# Patient Record
Sex: MALE | Race: WHITE
[De-identification: names, ages, dates, MRNs, and addresses within clinical notes are randomized per-mention and may not be internally consistent; named-entity substitution may affect disease eponyms.]

---

## 2019-02-22 ENCOUNTER — HOSPITAL ENCOUNTER (OUTPATIENT)
Dept: HOSPITAL 89 - LAB | Age: 64
End: 2019-02-22
Attending: OTOLARYNGOLOGY
Payer: COMMERCIAL

## 2019-02-22 DIAGNOSIS — H93.19: Primary | ICD-10-CM

## 2019-02-22 PROCEDURE — 82565 ASSAY OF CREATININE: CPT

## 2019-02-22 PROCEDURE — 36415 COLL VENOUS BLD VENIPUNCTURE: CPT

## 2019-02-28 ENCOUNTER — HOSPITAL ENCOUNTER (OUTPATIENT)
Dept: HOSPITAL 89 - AUD | Age: 64
End: 2019-02-28
Attending: OTOLARYNGOLOGY
Payer: COMMERCIAL

## 2019-02-28 DIAGNOSIS — H93.19: Primary | ICD-10-CM

## 2019-02-28 PROCEDURE — 92570 ACOUSTIC IMMITANCE TESTING: CPT

## 2019-02-28 PROCEDURE — 92557 COMPREHENSIVE HEARING TEST: CPT

## 2019-03-04 ENCOUNTER — HOSPITAL ENCOUNTER (OUTPATIENT)
Dept: HOSPITAL 89 - MRI | Age: 64
End: 2019-03-04
Attending: OTOLARYNGOLOGY
Payer: COMMERCIAL

## 2019-03-04 DIAGNOSIS — J34.2: Primary | ICD-10-CM

## 2019-03-04 PROCEDURE — 70553 MRI BRAIN STEM W/O & W/DYE: CPT

## 2019-03-04 NOTE — RADIOLOGY IMAGING REPORT
FACILITY: [facility] 

 

PATIENT NAME: Jesu Hart

: 1955

MR: [id number]

V: [id number]

EXAM DATE: 

ORDERING PHYSICIAN: LIZA VALVERDE

TECHNOLOGIST: 

 

Location: [facility]

Patient: Jesu Hart

: 1955

MRN: [id number]

Visit/Account:[id number]

Date of Sevice:  3/04/2019

 

ACCESSION #: [id number]

 

EXAMINATION:

MRI brain without IV contrast

MRI brain with IV contrast, detailed IACs

 

HISTORY:   Asymmetrical sensorineural hearing loss, dizziness, tinnitus of both ears.

 

COMPARISON:  None.

 

TECHNIQUE:  Multi-planar, multi-sequence brain MRI was performed before and after IV gadolinium. Thin
 section imaging was performed in the axial and coronal planes centered on the IACs.

CONTRAST: 15 mL of IV MultiHance gadolinium.

 

FINDINGS:

IAC detailed study:

Brain stem: Negative.

Cerebellopontine angles: Negative.

IACs/CN VII and VIII: There is no filling defect in either internal auditory canal.

Inner ear: Negative.

Middle ear: Negative.

Mastoids/petrous apices: Negative.

 

Rest of brain:

Brain volume:  Normal.

Sagittal midline structures: Normal.

Ventricles:  Normal.

Acute ischemic changes:  No diffusion restriction present to suggest acute ischemia.

Hemorrhage:  None.

Masses/edema:  None.

 

Enhancement: There is no abnormal enhancement in the posterior fossa.

 

Gray-white: Negative.

White matter:  Normal.

Vessels:  Normal.

Extra-axial:  Normal.

Calvarium/scalp:  Negative.

Skull base:  Negative.

Visualized sinuses/orbits:  Minimal mucosal thickening in the bilateral ethmoid air cells. There is m
ild nasal septal deviation to the right.

Visualized upper neck: Negative.

 

IMPRESSION:

 

1. No posterior fossa mass lesion or abnormal enhancement.

 

2. No acute infarct, hemorrhage or intracranial mass lesion.

 

Report Dictated By: Pao Magaña MD at 3/4/2019 9:11 AM

 

Report E-Signed By: Pao Magaña MD  at 3/4/2019 9:17 AM

 

WSN:[id number]